# Patient Record
Sex: FEMALE | Race: WHITE | NOT HISPANIC OR LATINO | URBAN - METROPOLITAN AREA
[De-identification: names, ages, dates, MRNs, and addresses within clinical notes are randomized per-mention and may not be internally consistent; named-entity substitution may affect disease eponyms.]

---

## 2022-10-03 ENCOUNTER — HOSPITAL ENCOUNTER (EMERGENCY)
Facility: HOSPITAL | Age: 32
Discharge: HOME | End: 2022-10-03
Attending: EMERGENCY MEDICINE
Payer: COMMERCIAL

## 2022-10-03 VITALS
DIASTOLIC BLOOD PRESSURE: 102 MMHG | TEMPERATURE: 98.3 F | HEIGHT: 65 IN | SYSTOLIC BLOOD PRESSURE: 160 MMHG | BODY MASS INDEX: 19.16 KG/M2 | RESPIRATION RATE: 18 BRPM | HEART RATE: 105 BPM | OXYGEN SATURATION: 98 % | WEIGHT: 115 LBS

## 2022-10-03 DIAGNOSIS — M54.2 NECK PAIN: Primary | ICD-10-CM

## 2022-10-03 PROCEDURE — 99283 EMERGENCY DEPT VISIT LOW MDM: CPT

## 2022-10-03 PROCEDURE — 99282 EMERGENCY DEPT VISIT SF MDM: CPT | Performed by: EMERGENCY MEDICINE

## 2022-10-03 NOTE — ED ATTENDING ATTESTATION
10/3/2022  IHusam MD, saw and evaluated the patient  I have discussed the patient with the resident/non-physician practitioner and agree with the resident's/non-physician practitioner's findings, Plan of Care, and MDM as documented in the resident's/non-physician practitioner's note, except where noted  All available labs and Radiology studies were reviewed  I was present for key portions of any procedure(s) performed by the resident/non-physician practitioner and I was immediately available to provide assistance  At this point I agree with the current assessment done in the Emergency Department  I have conducted an independent evaluation of this patient a history and physical is as follows:    ED Course         Critical Care Time  Procedures    30 yo female with neck pain bilaterally for few days and feels stiff and pain to move  Pt went to urgent care and told to come here to rule out meningitis  No fever, no n/v   Vss, afebrile, lungs cta, rrr, abodmen soft nontender, no neuro deficits, no meningeal signs, msk pain, reassurance

## 2022-10-03 NOTE — ED PROVIDER NOTES
History  Chief Complaint   Patient presents with   • Neck Pain     Reports neck pain since last week, worse Sunday morning with stiffness and "seeing starts " Pt with full ROM noted in triage, no known injuries  [facility]  Reports no fever or other symptoms  Patient is a 30 YO F, no significant PMHx, who presents to the ED for neck pain  Patient states the pain started 3 days ago  It has slowly worsened since  Pain is worse with certain movements, and at night  She states she feels as if it starts at the base of the neck and moves up  She states yesterday morning around 4am the pain substantial worsened and was an 8/10  At maximal intensity, it made her "see stars" and her hearing faded  This resolved after a couple of minuets of sitting down  She has tried Advil every 6hrs for the pain which helps  She has also tried ice with some relief  There are no other modifying factors  There are no other associated symptoms  She denies any trauma or accidents  She denies any prior history of pain like this in the past  She denies any fevers, chills, nausea, vomiting, diarrhea, abdominal pain, back pain, urinary symptoms, sick contacts, or any other new or concerning symptoms  Of note, [facility]  There, they were concerned about meningitis so they sent her ot the ED for further evaluation  History provided by:  Patient   used: No    Neck Pain  Associated symptoms: no chest pain, no fever, no headaches, no numbness and no weakness        None       History reviewed  No pertinent past medical history  History reviewed  No pertinent surgical history  History reviewed  No pertinent family history  I have reviewed and agree with the history as documented      E-Cigarette/Vaping   • E-Cigarette Use Never User      E-Cigarette/Vaping Substances     Social History     Tobacco Use   • Smoking status: Never Smoker   • Smokeless tobacco: Never Used   Vaping Use   • Vaping Use: Never used   Substance Use Topics   • Alcohol use: Yes     Comment: occ   • Drug use: Never        Review of Systems   Constitutional: Negative for chills and fever  Respiratory: Negative for shortness of breath  Cardiovascular: Negative for chest pain  Gastrointestinal: Negative for abdominal pain, diarrhea, nausea and vomiting  Genitourinary: Negative for difficulty urinating, dysuria and hematuria  Musculoskeletal: Positive for neck pain  Negative for back pain and neck stiffness  Neurological: Negative for dizziness, weakness, numbness and headaches  All other systems reviewed and are negative  Physical Exam  ED Triage Vitals   Temperature Pulse Respirations Blood Pressure SpO2   10/03/22 1840 10/03/22 1839 10/03/22 1839 10/03/22 1839 10/03/22 1839   98 3 °F (36 8 °C) 105 18 (!) 160/102 98 %      Temp Source Heart Rate Source Patient Position - Orthostatic VS BP Location FiO2 (%)   10/03/22 1840 -- -- -- --   Oral          Pain Score       10/03/22 1839       4             Orthostatic Vital Signs  Vitals:    10/03/22 1839   BP: (!) 160/102   Pulse: 105       Physical Exam  Vitals and nursing note reviewed  Constitutional:       General: She is not in acute distress  Appearance: She is well-developed  She is not diaphoretic  HENT:      Head: Normocephalic and atraumatic  Right Ear: External ear normal       Left Ear: External ear normal       Nose: Nose normal    Eyes:      General: Lids are normal  No scleral icterus  Extraocular Movements: Extraocular movements intact  Right eye: Normal extraocular motion and no nystagmus  Left eye: Normal extraocular motion and no nystagmus  Neck:      Meningeal: Brudzinski's sign and Kernig's sign absent  Comments: There is mild paracervical/trapezius tenderness  No overlying skin changes  No crepitus  No midline tenderness   FROM w/out pain or restriction in motion  Cardiovascular:      Rate and Rhythm: Normal rate and regular rhythm  Heart sounds: Normal heart sounds  No murmur heard  No friction rub  No gallop  Pulmonary:      Effort: Pulmonary effort is normal  No respiratory distress  Breath sounds: Normal breath sounds  No wheezing or rales  Abdominal:      Palpations: Abdomen is soft  Tenderness: There is no abdominal tenderness  There is no guarding or rebound  Musculoskeletal:         General: No deformity  Normal range of motion  Cervical back: Full passive range of motion without pain, normal range of motion and neck supple  No signs of trauma, rigidity, torticollis or crepitus  Muscular tenderness present  No pain with movement or spinous process tenderness  Normal range of motion  Skin:     General: Skin is warm and dry  Neurological:      General: No focal deficit present  Mental Status: She is alert and oriented to person, place, and time  GCS: GCS eye subscore is 4  GCS verbal subscore is 5  GCS motor subscore is 6  Cranial Nerves: Cranial nerves are intact  No cranial nerve deficit or facial asymmetry  Sensory: Sensation is intact  No sensory deficit  Motor: Motor function is intact  No weakness  Gait: Gait is intact  Gait normal    Psychiatric:         Mood and Affect: Mood normal          Behavior: Behavior normal          ED Medications  Medications - No data to display    Diagnostic Studies  Results Reviewed     None                 No orders to display         Procedures  Procedures      ED Course                                       MDM  Number of Diagnoses or Management Options  Neck pain  Diagnosis management comments: Patient is a 29 y o  female who presents to the ED for neck pain  No systemic symptoms  Vitals stable  No neurologic deficits  No signs of meningitis  Patient well appearing, non-toxic  Clinical impression is MSK neck pain   History and physical not consistent with meningitis  Discussed with patient that pain is likely musculoskeletal in nature and it is very unlikely pain is from meningitis  Discussed signs and symptoms of meninginitis  Patient declined pain meds  Will d/c  Recommended PCP f/u  Recommended tylenol or motrin as needed  Recommended PCP f/u  Patient verbalized understanding and agreed with plan of care  Portions of the record may have been created with voice recognition software  Occasional wrong word or "sound a like" substitutions may have occurred due to the inherent limitations of voice recognition software  Read the chart carefully and recognize, using context, where substitutions have occurred  Risk of Complications, Morbidity, and/or Mortality  Presenting problems: low  Diagnostic procedures: minimal  Management options: minimal    Patient Progress  Patient progress: stable      Disposition  Final diagnoses:   Neck pain     Time reflects when diagnosis was documented in both MDM as applicable and the Disposition within this note     Time User Action Codes Description Comment    10/3/2022  8:04 PM Patricia Oliva Add [M54 2] Neck pain       ED Disposition     ED Disposition   Discharge    Condition   Stable    Date/Time   Mon Oct 3, 2022  7:55 PM    Comment   Blanche Angelucci discharge to home/self care  Follow-up Information     Follow up With Specialties Details Why Contact Info Additional Information    Infolink  Call in 1 day  [facility] Emergency Department Emergency Medicine  As needed [address] [id number]  [address] Emergency Department, [address], [id number] [phone]          There are no discharge medications for this patient  No discharge procedures on file      PDMP Review     None           ED Provider  Attending physically available and coretta Burk I managed the patient along with the ED Attending      Electronically Signed by         Halle Lea DO  10/04/22 103

## 2022-10-04 NOTE — DISCHARGE INSTRUCTIONS
You have been evaluated in the Emergency Department today for neck pain  Your evaluation did not find evidence of medical conditions requiring emergent intervention at this time  You may take ibuprofen every 6 hours or tylenol every 6 hours as needed for pain  Please schedule an appointment for follow up with your primary care physician this week  Return to the Emergency Department if you experience worsening pain, numbness, tingling, change of color in your fingers, or any other concerning symptoms